# Patient Record
Sex: MALE | Race: WHITE | Employment: STUDENT | URBAN - METROPOLITAN AREA
[De-identification: names, ages, dates, MRNs, and addresses within clinical notes are randomized per-mention and may not be internally consistent; named-entity substitution may affect disease eponyms.]

---
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
## 2022-01-17 ENCOUNTER — OFFICE VISIT (OUTPATIENT)
Dept: FAMILY MEDICINE CLINIC | Age: 22
End: 2022-01-17
Payer: COMMERCIAL

## 2022-01-17 VITALS
DIASTOLIC BLOOD PRESSURE: 70 MMHG | OXYGEN SATURATION: 95 % | HEART RATE: 78 BPM | SYSTOLIC BLOOD PRESSURE: 120 MMHG | WEIGHT: 154 LBS | BODY MASS INDEX: 19.64 KG/M2

## 2022-01-17 DIAGNOSIS — L72.9 CYST OF SKIN: Primary | ICD-10-CM

## 2022-01-17 PROCEDURE — 99213 OFFICE O/P EST LOW 20 MIN: CPT | Performed by: NURSE PRACTITIONER

## 2022-01-17 RX ORDER — SULFAMETHOXAZOLE AND TRIMETHOPRIM 800; 160 MG/1; MG/1
1 TABLET ORAL 2 TIMES DAILY
Qty: 14 TABLET | Refills: 0 | Status: SHIPPED | OUTPATIENT
Start: 2022-01-17 | End: 2022-01-24

## 2022-01-17 ASSESSMENT — ENCOUNTER SYMPTOMS
SHORTNESS OF BREATH: 0
COUGH: 0

## 2022-01-17 NOTE — PROGRESS NOTES
[address] [place] [address], [address] [place]  [phone]    Maria Eugenia Green is a 25 y.o. male who presents today for his  medical conditions/complaints as noted below. Maria Eugenia Green is c/o of Mass (back of neck)  . HPI:     HPI   Cyst has been there a year, it has gotten bigger. He has been seen by derm for recurrent cysts in the past, they didn't really have anything to add. The cyst on the back of his neck is painful. It is just off of midline to the left. Nursing note reviewed and discussed with patient. Patient's medications, allergies, past medical, surgical, social and family histories were reviewed and updated asappropriate. Current Outpatient Medications   Medication Sig Dispense Refill    sulfamethoxazole-trimethoprim (BACTRIM DS) 800-160 MG per tablet Take 1 tablet by mouth 2 times daily for 7 days 14 tablet 0     No current facility-administered medications for this visit. No past medical history on file. No past surgical history on file. No family history on file. Social History     Tobacco Use    Smoking status: Never Smoker    Smokeless tobacco: Never Used   Substance Use Topics    Alcohol use: Never      No Known Allergies    Subjective:      Review of Systems   Constitutional: Negative for chills and fever. Respiratory: Negative for cough and shortness of breath. Cardiovascular: Negative for chest pain and leg swelling. Other pertinent ROS in HPI  Objective:     /70 (Site: Left Upper Arm, Position: Sitting, Cuff Size: Medium Adult)   Pulse 78   Wt 154 lb (69.9 kg)   SpO2 95%   BMI 19.64 kg/m²    Physical Exam  Constitutional:       General: He is not in acute distress. Appearance: He is well-developed. HENT:      Head: Normocephalic. Right Ear: External ear normal.      Left Ear: External ear normal.   Cardiovascular:      Rate and Rhythm: Normal rate and regular rhythm.       Heart sounds: Normal heart sounds. Pulmonary:      Effort: Pulmonary effort is normal.      Breath sounds: Normal breath sounds. Musculoskeletal:         General: Normal range of motion. Skin:     General: Skin is warm and dry. Comments: 1 1/2 inch round inflamed cyst, red, swollen, no surrounding erythema, no drainage. Neurological:      Mental Status: He is alert and oriented to person, place, and time. Assessment/PLAN   1. Cyst of skin  Warm compress to area multiple times a day  Call soon for derm apt  Do not feel comfortable lancing today d/t location  - sulfamethoxazole-trimethoprim (BACTRIM DS) 800-160 MG per tablet; Take 1 tablet by mouth 2 times daily for 7 days  Dispense: 14 tablet; Refill: 0  - AFL - Francisco J Teixeira MD, Dermatology, [facility] if symptoms worsen or fail to improve  Pt agreeable with plan      Patient given educational materials - see patientinstructions. Discussed use, benefit, and side effects of prescribed medications. All patient questions answered. Pt voiced understanding. Reviewed health maintenance. Instructed to continue current medications, diet andexercise. 1.  Mike received counseling on the following healthy behaviors: nutrition, exercise and medication adherence  2. Patient given educationalmaterials when available - see patient instructions when applicable  3. Discussed use, benefit, and side effects of prescribed medications. Barriersto medication compliance addressed. All patient questions answered. Pt voiced understanding. 4.  Reviewed prior labs and health maintenance when applicable. 5.  Continue current medications, diet and exercise. CompletedRefills   Requested Prescriptions     Signed Prescriptions Disp Refills    sulfamethoxazole-trimethoprim (BACTRIM DS) 800-160 MG per tablet 14 tablet 0     Sig: Take 1 tablet by mouth 2 times daily for 7 days       This note was transcribed using dictation with Dragon services.  Efforts were made to correct any errors but some words may be misinterpreted.     Electronically signed by MARCIE Mcwilliams CNP, CNP on 1/17/2022 at 3:27 PM

## 2022-01-17 NOTE — PROGRESS NOTES
Patient is present complaining of bump on back of neck x2 weeks  Patient states it has been getting bigger and is painful

## 2023-08-10 ENCOUNTER — HOSPITAL ENCOUNTER (OUTPATIENT)
Age: 23
Setting detail: SPECIMEN
Discharge: HOME | End: 2023-08-10

## 2023-08-10 ENCOUNTER — OFFICE VISIT (OUTPATIENT)
Dept: FAMILY MEDICINE CLINIC | Age: 23
End: 2023-08-10
Payer: COMMERCIAL

## 2023-08-10 VITALS
DIASTOLIC BLOOD PRESSURE: 70 MMHG | BODY MASS INDEX: 21.94 KG/M2 | WEIGHT: 171 LBS | OXYGEN SATURATION: 96 % | RESPIRATION RATE: 20 BRPM | HEART RATE: 73 BPM | SYSTOLIC BLOOD PRESSURE: 104 MMHG | HEIGHT: 74 IN

## 2023-08-10 DIAGNOSIS — F90.0 ATTENTION DEFICIT HYPERACTIVITY DISORDER (ADHD), PREDOMINANTLY INATTENTIVE TYPE: ICD-10-CM

## 2023-08-10 DIAGNOSIS — Z02.89 MEDICATION MANAGEMENT CONTRACT AGREEMENT: ICD-10-CM

## 2023-08-10 DIAGNOSIS — Z00.00 ANNUAL VISIT FOR GENERAL ADULT MEDICAL EXAMINATION WITHOUT ABNORMAL FINDINGS: Primary | ICD-10-CM

## 2023-08-10 PROCEDURE — 99395 PREV VISIT EST AGE 18-39: CPT | Performed by: NURSE PRACTITIONER

## 2023-08-10 PROCEDURE — 99214 OFFICE O/P EST MOD 30 MIN: CPT | Performed by: NURSE PRACTITIONER

## 2023-08-10 SDOH — ECONOMIC STABILITY: FOOD INSECURITY: WITHIN THE PAST 12 MONTHS, YOU WORRIED THAT YOUR FOOD WOULD RUN OUT BEFORE YOU GOT MONEY TO BUY MORE.: NEVER TRUE

## 2023-08-10 SDOH — ECONOMIC STABILITY: HOUSING INSECURITY
IN THE LAST 12 MONTHS, WAS THERE A TIME WHEN YOU DID NOT HAVE A STEADY PLACE TO SLEEP OR SLEPT IN A SHELTER (INCLUDING NOW)?: NO

## 2023-08-10 SDOH — ECONOMIC STABILITY: INCOME INSECURITY: HOW HARD IS IT FOR YOU TO PAY FOR THE VERY BASICS LIKE FOOD, HOUSING, MEDICAL CARE, AND HEATING?: NOT HARD AT ALL

## 2023-08-10 SDOH — ECONOMIC STABILITY: FOOD INSECURITY: WITHIN THE PAST 12 MONTHS, THE FOOD YOU BOUGHT JUST DIDN'T LAST AND YOU DIDN'T HAVE MONEY TO GET MORE.: NEVER TRUE

## 2023-08-10 ASSESSMENT — PATIENT HEALTH QUESTIONNAIRE - PHQ9
SUM OF ALL RESPONSES TO PHQ QUESTIONS 1-9: 0
2. FEELING DOWN, DEPRESSED OR HOPELESS: 0
SUM OF ALL RESPONSES TO PHQ9 QUESTIONS 1 & 2: 0
SUM OF ALL RESPONSES TO PHQ QUESTIONS 1-9: 0
SUM OF ALL RESPONSES TO PHQ QUESTIONS 1-9: 0
1. LITTLE INTEREST OR PLEASURE IN DOING THINGS: 0
SUM OF ALL RESPONSES TO PHQ QUESTIONS 1-9: 0

## 2023-08-10 ASSESSMENT — ENCOUNTER SYMPTOMS
CONSTIPATION: 0
SINUS PRESSURE: 0
ABDOMINAL PAIN: 0
CHEST TIGHTNESS: 0
BACK PAIN: 0
SINUS PAIN: 0
COLOR CHANGE: 0
DIARRHEA: 0
SHORTNESS OF BREATH: 0
EYE PAIN: 0
NAUSEA: 0
VOMITING: 0

## 2023-08-10 NOTE — PROGRESS NOTES
Micky Ayers (:  2000) is a 23 y.o. male,Established patient, here for evaluation of the following chief complaint(s): Annual Exam (States he was tested for ADHD-[facility] in [place]. Paperwork was provided. ) and Health Maintenance (Depression screen completed. /SDOH completed. /)         ASSESSMENT/PLAN:  1. Annual visit for general adult medical examination without abnormal findings  Comments:  School physical form signed and scanned into media. 2. Medication management contract agreement  -     Drug Screen, Pain; Future  3. Attention deficit hyperactivity disorder (ADHD), predominantly inattentive type  -     lisdexamfetamine (VYVANSE) 30 MG capsule; Take 1 capsule by mouth every morning for 30 days. Max Daily Amount: 30 mg, Disp-30 capsule, R-0Normal  ADHD evaluation form with diagnosis scanned into Media  Medication contract signed today  IVETT obtained today  Discussed we will start Vyvanse Rx today, if dose has to be titrated more than once or if medication does not seem to work for him he will need to establish with psychiatrist for management of this condition. Verbalized understanding. Return in about 3 months (around 11/10/2023) for 3 month follow up - ADHD . Subjective   SUBJECTIVE/OBJECTIVE:  Presents for annual physical and to discuss ADHD   Going to nursing school at [employer]  Has 2 more semesters of school left   Has physical form he needs completed     Had exam completed through [facility], diagnosed with ADHD. Referred back to PCP for further management.    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

## 2023-08-15 LAB
6-ACETYLMORPHINE, UR: NOT DETECTED
7-AMINOCLONAZEPAM, URINE: NOT DETECTED
ALPHA-OH-ALPRAZ, URINE: NOT DETECTED
ALPHA-OH-MIDAZOLAM, URINE: NOT DETECTED
ALPRAZOLAM, URINE: NOT DETECTED
AMPHETAMINES, URINE: NOT DETECTED
BARBITURATES, URINE: NOT DETECTED
BENZOYLECGONINE, UR: NORMAL
BUPRENORPHINE URINE: NOT DETECTED
CARISOPRODOL, UR: NOT DETECTED
CLONAZEPAM, URINE: NOT DETECTED
CODEINE, URINE: NOT DETECTED
CREATININE URINE: 188 MG/DL (ref 20–400)
DIAZEPAM, URINE: NOT DETECTED
DRUGS EXPECTED, UR: NORMAL
EER HI RES INTERP UR: NORMAL
ETHYL GLUCURONIDE UR: NORMAL
FENTANYL URINE: NOT DETECTED
GABAPENTIN: PRESENT
HYDROCODONE, OPI6M: PRESENT
HYDROMORPHONE, OPI3M: PRESENT
LORAZEPAM, URINE: NOT DETECTED
MARIJUANA METAB, UR: NORMAL
MDA, UR: NOT DETECTED
MDEA, EVE, UR: NOT DETECTED
MDMA URINE: NOT DETECTED
MEPERIDINE METAB, UR: NOT DETECTED
METHADONE, URINE: NOT DETECTED
METHAMPHETAMINE, URINE: NOT DETECTED
METHYLPHENIDATE: NOT DETECTED
MIDAZOLAM, URINE: NOT DETECTED
MORPHINE, OPI1M: NOT DETECTED
NALOXONE URINE: NOT DETECTED
NORBUPRENORPHINE, URINE: NOT DETECTED
NORDIAZEPAM, URINE: NOT DETECTED
NORFENTANYL, URINE: NOT DETECTED
NORHYDROCODONE, URINE: PRESENT
NOROXYCODONE, URINE: NOT DETECTED
NOROXYMORPHONE, URINE: NOT DETECTED
OXAZEPAM, URINE: NOT DETECTED
OXYCODONE URINE: NOT DETECTED
OXYMORPHONE, URINE: NOT DETECTED
PAIN MANAGEMENT DRUG PANEL INTERP, URINE: NORMAL
PAIN MGT DRUG PANEL, HI RES, UR: NORMAL
PCP,URINE: NORMAL
PHENTERMINE, UR: NOT DETECTED
PREGABALIN: NOT DETECTED
TAPENTADOL, URINE: NOT DETECTED
TAPENTADOL-O-SULFATE, URINE: NOT DETECTED
TEMAZEPAM, URINE: NOT DETECTED
TRAMADOL, URINE: NORMAL
ZOLPIDEM METABOLITE (ZCA), URINE: NOT DETECTED
ZOLPIDEM, URINE: NOT DETECTED

## 2023-09-14 ENCOUNTER — OFFICE VISIT (OUTPATIENT)
Dept: FAMILY MEDICINE CLINIC | Age: 23
End: 2023-09-14
Payer: COMMERCIAL

## 2023-09-14 VITALS
OXYGEN SATURATION: 98 % | TEMPERATURE: 98.5 F | HEIGHT: 74 IN | RESPIRATION RATE: 20 BRPM | HEART RATE: 69 BPM | WEIGHT: 169.4 LBS | SYSTOLIC BLOOD PRESSURE: 110 MMHG | BODY MASS INDEX: 21.74 KG/M2 | DIASTOLIC BLOOD PRESSURE: 70 MMHG

## 2023-09-14 DIAGNOSIS — R06.02 SHORTNESS OF BREATH: ICD-10-CM

## 2023-09-14 DIAGNOSIS — R82.5 POSITIVE URINE DRUG SCREEN: ICD-10-CM

## 2023-09-14 DIAGNOSIS — F90.0 ATTENTION DEFICIT HYPERACTIVITY DISORDER (ADHD), PREDOMINANTLY INATTENTIVE TYPE: ICD-10-CM

## 2023-09-14 DIAGNOSIS — J06.9 URI WITH COUGH AND CONGESTION: Primary | ICD-10-CM

## 2023-09-14 PROCEDURE — 99214 OFFICE O/P EST MOD 30 MIN: CPT | Performed by: NURSE PRACTITIONER

## 2023-09-14 RX ORDER — METHYLPREDNISOLONE 4 MG/1
TABLET ORAL
Qty: 1 KIT | Refills: 0 | Status: SHIPPED | OUTPATIENT
Start: 2023-09-14 | End: 2023-09-20

## 2023-09-14 RX ORDER — BENZONATATE 100 MG/1
100-200 CAPSULE ORAL 3 TIMES DAILY PRN
Qty: 60 CAPSULE | Refills: 0 | Status: SHIPPED | OUTPATIENT
Start: 2023-09-14 | End: 2023-09-21

## 2023-09-14 RX ORDER — ALBUTEROL SULFATE 90 UG/1
2 AEROSOL, METERED RESPIRATORY (INHALATION) EVERY 6 HOURS PRN
Qty: 18 G | Refills: 3 | Status: SHIPPED | OUTPATIENT
Start: 2023-09-14

## 2023-09-14 RX ORDER — AZITHROMYCIN 250 MG/1
250 TABLET, FILM COATED ORAL SEE ADMIN INSTRUCTIONS
Qty: 6 TABLET | Refills: 0 | Status: SHIPPED | OUTPATIENT
Start: 2023-09-14 | End: 2023-09-19

## 2023-09-14 ASSESSMENT — ENCOUNTER SYMPTOMS
SHORTNESS OF BREATH: 1
SINUS PRESSURE: 0
SINUS PAIN: 0
COUGH: 1
DIARRHEA: 0
CHEST TIGHTNESS: 0
NAUSEA: 0
WHEEZING: 0

## 2023-09-14 NOTE — PROGRESS NOTES
SUBJECTIVE/OBJECTIVE:  Presents for acute visit    Has had cough, congestion, runny nose for the last 2.5 weeks   Currently has dry intermittent cough   No sinus pain/pressure, fever, body aches/chills, n/v/d or headache   Has treated symptoms with OTC nyquil, antihistamines - did not help much  Did not get swabbed for covid-19    Would like to complete another IVETT   Feels previous results were inaccurate because he does not take anything not prescribed to him  Only drinks pre work out supplement     Denies any other problems/concerns at this time         Review of Systems   Constitutional:  Negative for fatigue and fever. HENT:  Positive for congestion. Negative for ear discharge, ear pain, postnasal drip, sinus pressure and sinus pain. Respiratory:  Positive for cough and shortness of breath. Negative for chest tightness and wheezing. Gastrointestinal:  Negative for diarrhea and nausea. Psychiatric/Behavioral:  Positive for decreased concentration. The patient is not nervous/anxious. Objective   Physical Exam  Vitals and nursing note reviewed. Constitutional:       Appearance: Normal appearance. He is not ill-appearing. HENT:      Head: Normocephalic. Right Ear: Hearing, tympanic membrane, ear canal and external ear normal.      Left Ear: Hearing, tympanic membrane, ear canal and external ear normal.      Nose: Congestion present. No rhinorrhea. Right Turbinates: Swollen. Left Turbinates: Swollen. Right Sinus: No maxillary sinus tenderness or frontal sinus tenderness. Left Sinus: No maxillary sinus tenderness or frontal sinus tenderness. Mouth/Throat:      Mouth: Mucous membranes are moist.      Pharynx: Oropharynx is clear. Uvula midline. Posterior oropharyngeal erythema present. No pharyngeal swelling or uvula swelling. Tonsils: No tonsillar exudate. Eyes:      Extraocular Movements: Extraocular movements intact.       Conjunctiva/sclera: Conjunctivae

## 2025-05-29 ASSESSMENT — PATIENT HEALTH QUESTIONNAIRE - PHQ9
SUM OF ALL RESPONSES TO PHQ QUESTIONS 1-9: 0
2. FEELING DOWN, DEPRESSED OR HOPELESS: NOT AT ALL
SUM OF ALL RESPONSES TO PHQ QUESTIONS 1-9: 0
SUM OF ALL RESPONSES TO PHQ QUESTIONS 1-9: 0
1. LITTLE INTEREST OR PLEASURE IN DOING THINGS: NOT AT ALL
SUM OF ALL RESPONSES TO PHQ QUESTIONS 1-9: 0
1. LITTLE INTEREST OR PLEASURE IN DOING THINGS: NOT AT ALL
SUM OF ALL RESPONSES TO PHQ9 QUESTIONS 1 & 2: 0
2. FEELING DOWN, DEPRESSED OR HOPELESS: NOT AT ALL

## 2025-05-30 ENCOUNTER — OFFICE VISIT (OUTPATIENT)
Dept: FAMILY MEDICINE CLINIC | Age: 25
End: 2025-05-30
Payer: COMMERCIAL

## 2025-05-30 VITALS
OXYGEN SATURATION: 98 % | BODY MASS INDEX: 22.84 KG/M2 | HEIGHT: 74 IN | HEART RATE: 71 BPM | WEIGHT: 178 LBS | DIASTOLIC BLOOD PRESSURE: 80 MMHG | SYSTOLIC BLOOD PRESSURE: 130 MMHG

## 2025-05-30 DIAGNOSIS — Z13.220 SCREENING, LIPID: ICD-10-CM

## 2025-05-30 DIAGNOSIS — K31.9 STOMACH DISORDER: ICD-10-CM

## 2025-05-30 DIAGNOSIS — Z79.899 MEDICATION MANAGEMENT: ICD-10-CM

## 2025-05-30 DIAGNOSIS — Z00.00 ENCOUNTER FOR WELL ADULT EXAM WITHOUT ABNORMAL FINDINGS: ICD-10-CM

## 2025-05-30 DIAGNOSIS — Z13.29 SCREENING FOR THYROID DISORDER: Primary | ICD-10-CM

## 2025-05-30 PROCEDURE — 99214 OFFICE O/P EST MOD 30 MIN: CPT | Performed by: NURSE PRACTITIONER

## 2025-05-30 RX ORDER — LISDEXAMFETAMINE DIMESYLATE 30 MG/1
30 CAPSULE ORAL EVERY MORNING
COMMUNITY

## 2025-05-30 SDOH — ECONOMIC STABILITY: FOOD INSECURITY: WITHIN THE PAST 12 MONTHS, THE FOOD YOU BOUGHT JUST DIDN'T LAST AND YOU DIDN'T HAVE MONEY TO GET MORE.: NEVER TRUE

## 2025-05-30 SDOH — ECONOMIC STABILITY: FOOD INSECURITY: WITHIN THE PAST 12 MONTHS, YOU WORRIED THAT YOUR FOOD WOULD RUN OUT BEFORE YOU GOT MONEY TO BUY MORE.: NEVER TRUE

## 2025-05-30 ASSESSMENT — ENCOUNTER SYMPTOMS
COUGH: 0
SHORTNESS OF BREATH: 0

## 2025-05-30 NOTE — PROGRESS NOTES
Mike Prasad (:  2000) is a 24 y.o. male,Established patient, here for evaluation of the following chief complaint(s):  Annual Exam    Assessment & Plan  Screening for thyroid disorder       Orders:    TSH; Future    Screening, lipid       Orders:    Lipid, Fasting; Future    Medication management       Orders:    CBC; Future    Comprehensive Metabolic Panel; Future    Stomach disorder   New, uncertain prognosis, continue current plan pending work up below    Orders:    Julio César Galdamez MD, Gastroenterology, [facility]    Encounter for well adult exam without abnormal findings              No follow-ups on file.       Subjective   HPI  Some gi concerns. Has some nausea and vomiting if he doesn't eat regularly.  Heart burn has been a common issue for him.  He feels this got worse four months ago. Has tried some otc stomach relief meds.   Drinking enough water.     Review of Systems   Constitutional:  Negative for chills and fever.   Respiratory:  Negative for cough and shortness of breath.    Cardiovascular:  Negative for chest pain and leg swelling.     Objective   Vitals:    25 0816   BP: 130/80   Pulse: 71   SpO2: 98%     Wt Readings from Last 3 Encounters:   25 80.7 kg (178 lb)   23 76.8 kg (169 lb 6.4 oz)   08/10/23 77.6 kg (171 lb)       Physical Exam  Constitutional:       General: He is not in acute distress.     Appearance: He is well-developed.   HENT:      Head: Normocephalic.      Right Ear: External ear normal.      Left Ear: External ear normal.   Cardiovascular:      Rate and Rhythm: Normal rate and regular rhythm.      Heart sounds: Normal heart sounds.   Pulmonary:      Effort: Pulmonary effort is normal.      Breath sounds: Normal breath sounds.   Musculoskeletal:         General: Normal range of motion.   Skin:     General: Skin is warm and dry.   Neurological:      Mental Status: He is alert and oriented to person, place, and time.           An electronic

## 2025-06-02 ENCOUNTER — TELEPHONE (OUTPATIENT)
Dept: GASTROENTEROLOGY | Age: 25
End: 2025-06-02

## 2025-06-02 NOTE — TELEPHONE ENCOUNTER
attempt 1 lm with mom for pt to call to schedule np appt-paula  attempt letter sent    Referred for stomach disorder

## 2025-07-02 NOTE — PROGRESS NOTES
fever, no chills, no lethargy, no weakness.  HEENT:  No headache, otalgia, itchy eyes, nasal discharge or sore throat.  Cardiac:  No chest pain, dyspnea, orthopnea or PND.  Chest:   No cough, phlegm or wheezing.  Abdomen:      Detailed by MA and HPI above  Neuro:  No focal weakness, abnormal movements or seizure like activity.  Skin:   No rashes, no itching.  :   No hematuria, no pyuria, no dysuria, no flank pain.  Extremities:  No swelling or joint pains.  ROS was otherwise negative    PHYSICAL EXAMINATION:   /82   Pulse 72   Ht 1.88 m (6' 2\")   Wt 76.9 kg (169 lb 9.6 oz)   SpO2 98%   BMI 21.78 kg/m²     Constitutional: Patient is oriented to person, place, and time. Patient appears well-developed and well-nourished.   HEENT: Normocephalic and atraumatic. EOM are normal. Normal range of motion. Neck supple, moist mucous membranes, no jaundice   Cardiovascular: Normal rate  Pulmonary/Chest: Effort normal and no respiratory distress. Equal bilateral chest rise.  Abdominal: Soft. Exhibits no distension and no mass. There is no tenderness. There is no rebound and no guarding.  Musculoskeletal: Normal range of motion.   Neurological: Patient is alert and oriented to person, place, and time. Motor grossly normal.   Psychiatric: Patient has a normal mood and affect. Patient behavior is normal.     LABORATORY DATA: Reviewed  No results found for: \"WBC\", \"HGB\", \"HCT\", \"MCV\", \"PLT\", \"NA\", \"K\", \"CL\", \"CO2\", \"BUN\", \"CREATININE\", \"LABPROT\", \"LABALBU\", \"GGT\", \"BILITOT\", \"ALKPHOS\", \"AST\", \"ALT\", \"INR\"    No results found for: \"RBC\", \"HGB\", \"MCV\", \"MCH\", \"MCHC\", \"RDW\", \"MPV\", \"BASOPCT\", \"LYMPHSABS\", \"MONOSABS\", \"NEUTROABS\", \"EOSABS\", \"BASOSABS\"    IMPRESSION: Mr.Austin Prasad is a very pleasant 24 y.o. male referred as new consultation for evaluation of weight loss, loss of appetite, epigastric pain    ASSESSMENT AND PLAN  1. Weight loss    2. Epigastric pain    3. Nausea and vomiting, unspecified vomiting type    4.

## 2025-07-03 ENCOUNTER — OFFICE VISIT (OUTPATIENT)
Dept: GASTROENTEROLOGY | Age: 25
End: 2025-07-03
Payer: COMMERCIAL

## 2025-07-03 ENCOUNTER — HOSPITAL ENCOUNTER (OUTPATIENT)
Age: 25
Setting detail: SPECIMEN
Discharge: HOME | End: 2025-07-03

## 2025-07-03 ENCOUNTER — PREP FOR PROCEDURE (OUTPATIENT)
Dept: GASTROENTEROLOGY | Age: 25
End: 2025-07-03

## 2025-07-03 VITALS
OXYGEN SATURATION: 98 % | BODY MASS INDEX: 21.77 KG/M2 | HEIGHT: 74 IN | HEART RATE: 72 BPM | SYSTOLIC BLOOD PRESSURE: 124 MMHG | DIASTOLIC BLOOD PRESSURE: 82 MMHG | WEIGHT: 169.6 LBS

## 2025-07-03 DIAGNOSIS — R10.13 EPIGASTRIC PAIN: ICD-10-CM

## 2025-07-03 DIAGNOSIS — R63.4 WEIGHT LOSS: Primary | ICD-10-CM

## 2025-07-03 DIAGNOSIS — R63.4 WEIGHT LOSS: ICD-10-CM

## 2025-07-03 DIAGNOSIS — Z79.899 MEDICATION MANAGEMENT: ICD-10-CM

## 2025-07-03 DIAGNOSIS — R11.2 NAUSEA AND VOMITING, UNSPECIFIED VOMITING TYPE: ICD-10-CM

## 2025-07-03 DIAGNOSIS — Z13.220 SCREENING, LIPID: ICD-10-CM

## 2025-07-03 DIAGNOSIS — K21.9 GASTROESOPHAGEAL REFLUX DISEASE, UNSPECIFIED WHETHER ESOPHAGITIS PRESENT: ICD-10-CM

## 2025-07-03 DIAGNOSIS — Z13.29 SCREENING FOR THYROID DISORDER: ICD-10-CM

## 2025-07-03 PROBLEM — R76.11 POSITIVE SKIN TEST FOR TUBERCULOSIS: Status: ACTIVE | Noted: 2023-11-03

## 2025-07-03 LAB
ALBUMIN SERPL-MCNC: 4.7 G/DL (ref 3.5–5.2)
ALBUMIN SERPL-MCNC: 4.8 G/DL (ref 3.5–5.2)
ALBUMIN/GLOB SERPL: 1.4 {RATIO} (ref 1–2.5)
ALBUMIN/GLOB SERPL: 1.5 {RATIO} (ref 1–2.5)
ALP SERPL-CCNC: 61 U/L (ref 40–129)
ALP SERPL-CCNC: 64 U/L (ref 40–129)
ALT SERPL-CCNC: 19 U/L (ref 10–50)
ALT SERPL-CCNC: 20 U/L (ref 10–50)
ANION GAP SERPL CALCULATED.3IONS-SCNC: 10 MMOL/L (ref 9–16)
ANION GAP SERPL CALCULATED.3IONS-SCNC: 11 MMOL/L (ref 9–16)
AST SERPL-CCNC: 25 U/L (ref 10–50)
AST SERPL-CCNC: 25 U/L (ref 10–50)
BASOPHILS # BLD: 0.04 K/UL (ref 0–0.2)
BASOPHILS NFR BLD: 1 % (ref 0–2)
BILIRUB DIRECT SERPL-MCNC: 0.3 MG/DL (ref 0–0.2)
BILIRUB INDIRECT SERPL-MCNC: 0.6 MG/DL (ref 0–1)
BILIRUB SERPL-MCNC: 0.9 MG/DL (ref 0–1.2)
BILIRUB SERPL-MCNC: 0.9 MG/DL (ref 0–1.2)
BUN SERPL-MCNC: 15 MG/DL (ref 6–20)
BUN SERPL-MCNC: 16 MG/DL (ref 6–20)
CALCIUM SERPL-MCNC: 9.6 MG/DL (ref 8.6–10.4)
CALCIUM SERPL-MCNC: 9.7 MG/DL (ref 8.6–10.4)
CHLORIDE SERPL-SCNC: 104 MMOL/L (ref 98–107)
CHLORIDE SERPL-SCNC: 105 MMOL/L (ref 98–107)
CHOLEST SERPL-MCNC: 154 MG/DL (ref 0–199)
CHOLESTEROL/HDL RATIO: 3.6
CO2 SERPL-SCNC: 25 MMOL/L (ref 20–31)
CO2 SERPL-SCNC: 25 MMOL/L (ref 20–31)
CREAT SERPL-MCNC: 1.1 MG/DL (ref 0.7–1.2)
CREAT SERPL-MCNC: 1.2 MG/DL (ref 0.7–1.2)
EOSINOPHIL # BLD: 0.15 K/UL (ref 0–0.44)
EOSINOPHILS RELATIVE PERCENT: 2 % (ref 1–4)
ERYTHROCYTE [DISTWIDTH] IN BLOOD BY AUTOMATED COUNT: 12.4 % (ref 11.8–14.4)
ERYTHROCYTE [DISTWIDTH] IN BLOOD BY AUTOMATED COUNT: 12.5 % (ref 11.8–14.4)
GFR, ESTIMATED: 88 ML/MIN/1.73M2
GFR, ESTIMATED: >90 ML/MIN/1.73M2
GLUCOSE SERPL-MCNC: 91 MG/DL (ref 74–99)
GLUCOSE SERPL-MCNC: 94 MG/DL (ref 74–99)
HCT VFR BLD AUTO: 48.8 % (ref 40.7–50.3)
HCT VFR BLD AUTO: 49.1 % (ref 40.7–50.3)
HDLC SERPL-MCNC: 43 MG/DL
HGB BLD-MCNC: 16.9 G/DL (ref 13–17)
HGB BLD-MCNC: 17 G/DL (ref 13–17)
IMM GRANULOCYTES # BLD AUTO: <0.03 K/UL (ref 0–0.3)
IMM GRANULOCYTES NFR BLD: 0 %
LDLC SERPL CALC-MCNC: 102 MG/DL (ref 0–100)
LIPASE SERPL-CCNC: 37 U/L (ref 13–60)
LYMPHOCYTES NFR BLD: 2.8 K/UL (ref 1.1–3.7)
LYMPHOCYTES RELATIVE PERCENT: 42 % (ref 24–43)
MCH RBC QN AUTO: 30.5 PG (ref 25.2–33.5)
MCH RBC QN AUTO: 30.6 PG (ref 25.2–33.5)
MCHC RBC AUTO-ENTMCNC: 34.6 G/DL (ref 28.4–34.8)
MCHC RBC AUTO-ENTMCNC: 34.6 G/DL (ref 28.4–34.8)
MCV RBC AUTO: 88.1 FL (ref 82.6–102.9)
MCV RBC AUTO: 88.5 FL (ref 82.6–102.9)
MONOCYTES NFR BLD: 0.53 K/UL (ref 0.1–1.2)
MONOCYTES NFR BLD: 8 % (ref 3–12)
NEUTROPHILS NFR BLD: 47 % (ref 36–65)
NEUTS SEG NFR BLD: 3.08 K/UL (ref 1.5–8.1)
NRBC BLD-RTO: 0 PER 100 WBC
NRBC BLD-RTO: 0 PER 100 WBC
PLATELET # BLD AUTO: 259 K/UL (ref 138–453)
PLATELET # BLD AUTO: 262 K/UL (ref 138–453)
PMV BLD AUTO: 9.4 FL (ref 8.1–13.5)
PMV BLD AUTO: 9.5 FL (ref 8.1–13.5)
POTASSIUM SERPL-SCNC: 4.7 MMOL/L (ref 3.7–5.3)
POTASSIUM SERPL-SCNC: 4.8 MMOL/L (ref 3.7–5.3)
PROT SERPL-MCNC: 7.9 G/DL (ref 6.6–8.7)
PROT SERPL-MCNC: 8 G/DL (ref 6.6–8.7)
RBC # BLD AUTO: 5.54 M/UL (ref 4.21–5.77)
RBC # BLD AUTO: 5.55 M/UL (ref 4.21–5.77)
SODIUM SERPL-SCNC: 140 MMOL/L (ref 136–145)
SODIUM SERPL-SCNC: 140 MMOL/L (ref 136–145)
TRIGL SERPL-MCNC: 47 MG/DL (ref 0–149)
TSH SERPL DL<=0.05 MIU/L-ACNC: 1.64 UIU/ML (ref 0.27–4.2)
VLDLC SERPL CALC-MCNC: 9 MG/DL (ref 1–30)
WBC OTHER # BLD: 6.6 K/UL (ref 3.5–11.3)
WBC OTHER # BLD: 6.6 K/UL (ref 3.5–11.3)

## 2025-07-03 PROCEDURE — 99204 OFFICE O/P NEW MOD 45 MIN: CPT | Performed by: STUDENT IN AN ORGANIZED HEALTH CARE EDUCATION/TRAINING PROGRAM

## 2025-07-07 ENCOUNTER — RESULTS FOLLOW-UP (OUTPATIENT)
Dept: FAMILY MEDICINE CLINIC | Age: 25
End: 2025-07-07

## 2025-07-07 ENCOUNTER — TELEPHONE (OUTPATIENT)
Dept: GASTROENTEROLOGY | Age: 25
End: 2025-07-07

## 2025-07-07 NOTE — TELEPHONE ENCOUNTER
Procedure scheduled/Dr Pemberton  Procedure: egd  Dx: gerd  Date: 7/10/25   Time: 12pm arrival 10am   Hospital: [facility] phone call: tbd   Bowel Prep instructions given: n/a   In office/via phone: office  Clearance needed: none  GLP - 1: none

## 2025-07-08 VITALS — BODY MASS INDEX: 21.69 KG/M2 | HEIGHT: 74 IN | WEIGHT: 169 LBS

## 2025-07-08 NOTE — PRE-PROCEDURE INSTRUCTIONS
No answer, left message ? Yes, voicemail                            Unable to leave message ?    When were you told to arrive at hospital ? 1000     Do you have a  ?    Are you on any blood thinners ?  no                   If yes when did you stop taking ?    Do you have your prep Rx filled and instruction ?  N/a    Nothing to eat the day before , only clear liquids.n/a    Are you experiencing any covid symptoms ?     Do you have any infections or rash we should be aware of ?      Do you have the Hibiclens soap to use the night before and the morning of surgery ?n/a    Nothing to eat or drink after midnight, only a sip of water to take any medication instructed to take the night before.  Wear comfortable clothing, leave any valuables at home, remove any jewelry and body piercing .

## 2025-07-08 NOTE — PROGRESS NOTES
Pre-op Instructions For Out-Patient Endoscopy Surgery    Medication Instructions:  Please stop herbs and any supplements now (includes vitamins and minerals).    For these medications:  Dulaglutide (Trulicity), Exenatide (Byetta and Bydureon, Liraglutide (Victoza), Lixisenatide (Adlyxin), Semaglutide (Ozempic and Rybelsus), Tirzepatide (Mounjaro, Zepbound,Wegovy)- Stop 1 week prior if taking weekly or 1 day prior if taking every 12 hours or daily.     Please contact your surgeon and prescribing physician for pre-op instructions for any blood thinners. Stop taking as directed    If you have inhalers/aerosol treatments at home, please use them the morning of your surgery and bring the inhalers with you to the hospital.    Please take the following medications the morning of your surgery with a sip of water:    Inhaler    Surgery Instructions:  After midnight before surgery:  Do not eat or drink anything, including water, mints, gum, and hard candy.  You may brush your teeth without swallowing.  No smoking, chewing tobacco, or street drugs.       ** Please Follow Bowel Prep instructions if given by surgeon's office**    Please shower or bathe before surgery.       Please do not wear any cologne, lotion, powder, jewelry, piercings, perfume, makeup, nail polish, hair accessories, or hair spray on the day of surgery.  Wear loose comfortable clothing.    Leave your valuables at home but bring a payment source for any after-surgery prescriptions you plan to fill at [facility].  Bring a storage case for any glasses/contacts.    An adult who is responsible for you MUST remain in the hospital and drive you home and should be with you for the first 24 hours after surgery.     The Day of Surgery:  Arrive at [facility] Surgery Entrance at the time directed by your surgeon and check in at the desk.     If you have a living will or healthcare power of , please bring a copy.    You will be taken to  the pre-op holding area where you will be prepared for surgery.  A physical assessment will be performed by a nurse practitioner or house officer.  Your IV will be started and you will meet your anesthesiologist.    When you go to surgery, your family will be directed to the surgical waiting room, where the doctor should speak with them after your surgery.    After surgery, you will be taken to the recovery area.  When you are alert and stable, you will receive instructions and be prepared for discharge.   Instructions reviewed, patient verbalizes understanding  EGD 7/10/25

## 2025-07-09 ENCOUNTER — HOSPITAL ENCOUNTER (OUTPATIENT)
Dept: PREADMISSION TESTING | Age: 25
Setting detail: OUTPATIENT SURGERY
Discharge: HOME | End: 2025-07-13
Payer: COMMERCIAL

## 2025-07-09 ENCOUNTER — ANESTHESIA EVENT (OUTPATIENT)
Dept: ENDOSCOPY | Age: 25
End: 2025-07-09
Payer: COMMERCIAL

## 2025-07-10 ENCOUNTER — ANESTHESIA (OUTPATIENT)
Dept: ENDOSCOPY | Age: 25
End: 2025-07-10
Payer: COMMERCIAL

## 2025-07-10 ENCOUNTER — HOSPITAL ENCOUNTER (OUTPATIENT)
Age: 25
Setting detail: OUTPATIENT SURGERY
Discharge: HOME | End: 2025-07-10
Attending: STUDENT IN AN ORGANIZED HEALTH CARE EDUCATION/TRAINING PROGRAM | Admitting: STUDENT IN AN ORGANIZED HEALTH CARE EDUCATION/TRAINING PROGRAM
Payer: COMMERCIAL

## 2025-07-10 VITALS
TEMPERATURE: 97.5 F | OXYGEN SATURATION: 99 % | WEIGHT: 169 LBS | BODY MASS INDEX: 21.69 KG/M2 | HEIGHT: 74 IN | HEART RATE: 53 BPM | SYSTOLIC BLOOD PRESSURE: 117 MMHG | RESPIRATION RATE: 17 BRPM | DIASTOLIC BLOOD PRESSURE: 70 MMHG

## 2025-07-10 DIAGNOSIS — K21.9 GERD (GASTROESOPHAGEAL REFLUX DISEASE): ICD-10-CM

## 2025-07-10 PROBLEM — K20.90 ESOPHAGITIS: Status: ACTIVE | Noted: 2025-07-10

## 2025-07-10 PROCEDURE — 7100000011 HC PHASE II RECOVERY - ADDTL 15 MIN: Performed by: STUDENT IN AN ORGANIZED HEALTH CARE EDUCATION/TRAINING PROGRAM

## 2025-07-10 PROCEDURE — 88342 IMHCHEM/IMCYTCHM 1ST ANTB: CPT

## 2025-07-10 PROCEDURE — 2580000003 HC RX 258: Performed by: ANESTHESIOLOGY

## 2025-07-10 PROCEDURE — 7100000010 HC PHASE II RECOVERY - FIRST 15 MIN: Performed by: STUDENT IN AN ORGANIZED HEALTH CARE EDUCATION/TRAINING PROGRAM

## 2025-07-10 PROCEDURE — 6360000002 HC RX W HCPCS: Performed by: ANESTHESIOLOGY

## 2025-07-10 PROCEDURE — 88305 TISSUE EXAM BY PATHOLOGIST: CPT

## 2025-07-10 PROCEDURE — 2709999900 HC NON-CHARGEABLE SUPPLY: Performed by: STUDENT IN AN ORGANIZED HEALTH CARE EDUCATION/TRAINING PROGRAM

## 2025-07-10 PROCEDURE — 3609012400 HC EGD TRANSORAL BIOPSY SINGLE/MULTIPLE: Performed by: STUDENT IN AN ORGANIZED HEALTH CARE EDUCATION/TRAINING PROGRAM

## 2025-07-10 PROCEDURE — 3700000000 HC ANESTHESIA ATTENDED CARE: Performed by: STUDENT IN AN ORGANIZED HEALTH CARE EDUCATION/TRAINING PROGRAM

## 2025-07-10 PROCEDURE — 6360000002 HC RX W HCPCS: Performed by: NURSE ANESTHETIST, CERTIFIED REGISTERED

## 2025-07-10 RX ORDER — LIDOCAINE HYDROCHLORIDE 10 MG/ML
1 INJECTION, SOLUTION EPIDURAL; INFILTRATION; INTRACAUDAL; PERINEURAL
Status: COMPLETED | OUTPATIENT
Start: 2025-07-10 | End: 2025-07-10

## 2025-07-10 RX ORDER — MIDAZOLAM HYDROCHLORIDE 2 MG/2ML
INJECTION, SOLUTION INTRAMUSCULAR; INTRAVENOUS
Status: DISCONTINUED | OUTPATIENT
Start: 2025-07-10 | End: 2025-07-10 | Stop reason: SDUPTHER

## 2025-07-10 RX ORDER — PROPOFOL 10 MG/ML
INJECTION, EMULSION INTRAVENOUS
Status: DISCONTINUED | OUTPATIENT
Start: 2025-07-10 | End: 2025-07-10 | Stop reason: SDUPTHER

## 2025-07-10 RX ORDER — SODIUM CHLORIDE, SODIUM LACTATE, POTASSIUM CHLORIDE, CALCIUM CHLORIDE 600; 310; 30; 20 MG/100ML; MG/100ML; MG/100ML; MG/100ML
INJECTION, SOLUTION INTRAVENOUS CONTINUOUS
Status: DISCONTINUED | OUTPATIENT
Start: 2025-07-10 | End: 2025-07-10 | Stop reason: HOSPADM

## 2025-07-10 RX ORDER — SODIUM CHLORIDE 0.9 % (FLUSH) 0.9 %
5-40 SYRINGE (ML) INJECTION PRN
Status: DISCONTINUED | OUTPATIENT
Start: 2025-07-10 | End: 2025-07-10 | Stop reason: HOSPADM

## 2025-07-10 RX ORDER — SODIUM CHLORIDE 9 MG/ML
INJECTION, SOLUTION INTRAVENOUS PRN
Status: DISCONTINUED | OUTPATIENT
Start: 2025-07-10 | End: 2025-07-10 | Stop reason: HOSPADM

## 2025-07-10 RX ORDER — SODIUM CHLORIDE, SODIUM LACTATE, POTASSIUM CHLORIDE, CALCIUM CHLORIDE 600; 310; 30; 20 MG/100ML; MG/100ML; MG/100ML; MG/100ML
INJECTION, SOLUTION INTRAVENOUS
Status: DISCONTINUED | OUTPATIENT
Start: 2025-07-10 | End: 2025-07-10

## 2025-07-10 RX ORDER — LIDOCAINE HYDROCHLORIDE 20 MG/ML
INJECTION, SOLUTION EPIDURAL; INFILTRATION; INTRACAUDAL; PERINEURAL
Status: DISCONTINUED | OUTPATIENT
Start: 2025-07-10 | End: 2025-07-10 | Stop reason: SDUPTHER

## 2025-07-10 RX ORDER — SODIUM CHLORIDE 0.9 % (FLUSH) 0.9 %
5-40 SYRINGE (ML) INJECTION EVERY 12 HOURS SCHEDULED
Status: DISCONTINUED | OUTPATIENT
Start: 2025-07-10 | End: 2025-07-10 | Stop reason: HOSPADM

## 2025-07-10 RX ORDER — PANTOPRAZOLE SODIUM 40 MG/1
40 TABLET, DELAYED RELEASE ORAL
Qty: 60 TABLET | Refills: 1 | Status: SHIPPED | OUTPATIENT
Start: 2025-07-10

## 2025-07-10 RX ADMIN — PROPOFOL 50 MG: 10 INJECTION, EMULSION INTRAVENOUS at 12:23

## 2025-07-10 RX ADMIN — PROPOFOL 170 MG: 10 INJECTION, EMULSION INTRAVENOUS at 12:17

## 2025-07-10 RX ADMIN — MIDAZOLAM HYDROCHLORIDE 2 MG: 1 INJECTION, SOLUTION INTRAMUSCULAR; INTRAVENOUS at 12:17

## 2025-07-10 RX ADMIN — PROPOFOL 40 MG: 10 INJECTION, EMULSION INTRAVENOUS at 12:22

## 2025-07-10 RX ADMIN — LIDOCAINE HYDROCHLORIDE 100 MG: 20 INJECTION, SOLUTION EPIDURAL; INFILTRATION; INTRACAUDAL; PERINEURAL at 12:17

## 2025-07-10 RX ADMIN — PROPOFOL 30 MG: 10 INJECTION, EMULSION INTRAVENOUS at 12:18

## 2025-07-10 RX ADMIN — PROPOFOL 70 MG: 10 INJECTION, EMULSION INTRAVENOUS at 12:21

## 2025-07-10 RX ADMIN — PROPOFOL 40 MG: 10 INJECTION, EMULSION INTRAVENOUS at 12:24

## 2025-07-10 RX ADMIN — SODIUM CHLORIDE, POTASSIUM CHLORIDE, SODIUM LACTATE AND CALCIUM CHLORIDE: 600; 310; 30; 20 INJECTION, SOLUTION INTRAVENOUS at 10:26

## 2025-07-10 RX ADMIN — LIDOCAINE HYDROCHLORIDE 1 ML: 10 INJECTION, SOLUTION EPIDURAL; INFILTRATION; INTRACAUDAL; PERINEURAL at 10:25

## 2025-07-10 ASSESSMENT — ENCOUNTER SYMPTOMS
ABDOMINAL PAIN: 0
SHORTNESS OF BREATH: 0
SINUS PRESSURE: 0
NAUSEA: 0

## 2025-07-10 ASSESSMENT — PAIN - FUNCTIONAL ASSESSMENT
PAIN_FUNCTIONAL_ASSESSMENT: 0-10
PAIN_FUNCTIONAL_ASSESSMENT: NONE - DENIES PAIN

## 2025-07-10 NOTE — OP NOTE
stable with stable vital signs and stable O2 saturations.       Findings:    Esophagus:   Normal upper mid esophagus  LA grade A/B esophagitis and irregular Z-line-biopsy    Stomach:    Fundus: normal    Body: normal    Antrum: abnormal: Mild erythematous gastritis  Biopsy obtained to r/o- H pylori     Stomach appropriately distensible with gas on direct and retroflexion view    Duodenum:     Descending: normal    Bulb: normal  Biopsy obtained to r/o- celiac disease     The scope was removed and the patient tolerated the procedure well.     Impression-   Grade A/B esophagitis and irregular Z-line-  Gastritis biopsy  Celiac biopsy    Recommendations/Plan:   F/U Biopsies  Pantoprazole 40 mg twice daily for 1 month  F/U In Office in 3-4 weeks  Return to normal activities from tomorrow. Follow-up with PCP as scheduled.    Electronically signed by Julio César Pemberton MD  on 7/10/2025 at 12:28 PM   This note is created with the assistance of a speech recognition program.  While intending to generate a document that actually reflects the content of the procedure, the document can still have some errors including those of syntax and sound a like substitutions which may escape proofreading.  It such instances, actual meaning can be extrapolated by contextual diversion.

## 2025-07-10 NOTE — H&P
HISTORY and PHYSICAL  [facility]       NAME:  Mike Prasad  MRN: [id number]   YOB: 2000   Date: 7/10/2025   Age: 24 y.o.  Gender: male       COMPLAINT AND PRESENT HISTORY:      Mike Prasad is 24 y.o.,   female, here for Procedure(s):  ESOPHAGOGASTRODUODENOSCOPY BIOPSY    Pt is being seen for :  Pre-Op Diagnosis Codes:      * GERD (gastroesophageal reflux disease)     No prior EGD done before.     No  abdominal pain  No   dysphagia  Yes   heartburn with selective foods. Takes OTC tums, tried prilosec x1  Yes  nausea frequently.    yes vomiting if over eats vomiting,   No diarrhea, constipation.  No  blood in stool, dark tarry stools.  Yes  changes in appetite and unintended weight loss. 10 lbs over 3 wk period of time.   no hx of smoking.     Medical history reviewed.  Asthma- sports induced  Denies chest pain/pressure, palpitations, SOB, recent URI, fever or chills.     blood thinning medications:  none  Patient denies ever having anesthesia before and no Family issues with anesthesia.     RECENT LABS, IMAGING AND TESTING     Lab Results   Component Value Date    WBC 6.6 07/03/2025    WBC 6.6 07/03/2025    RBC 5.55 07/03/2025    RBC 5.54 07/03/2025    HGB 17.0 07/03/2025    HGB 16.9 07/03/2025    HCT 49.1 07/03/2025    HCT 48.8 07/03/2025    MCV 88.5 07/03/2025    MCV 88.1 07/03/2025    MCH 30.6 07/03/2025    MCH 30.5 07/03/2025    MCHC 34.6 07/03/2025    MCHC 34.6 07/03/2025    RDW 12.5 07/03/2025    RDW 12.4 07/03/2025     07/03/2025     07/03/2025    MPV 9.4 07/03/2025    MPV 9.5 07/03/2025        Lab Results   Component Value Date     07/03/2025     07/03/2025    K 4.8 07/03/2025    K 4.7 07/03/2025     07/03/2025     07/03/2025    CO2 25 07/03/2025    CO2 25 07/03/2025    BUN 15 07/03/2025    BUN 16 07/03/2025    CREATININE 1.2 07/03/2025    CREATININE 1.1 07/03/2025    GLUCOSE 94 07/03/2025    GLUCOSE 91 07/03/2025    CALCIUM 9.7

## 2025-07-10 NOTE — DISCHARGE INSTRUCTIONS
EGD DISCHARGE INSTRUCTIONS    Activity:  Rest today. No driving, operating machinery, or making any important decisions today. May resume normal activity tomorrow.    Diet:  Following EGD eat slowly, chew food well, cut food into small pieces-Avoid greasy, spicy, \"crunchy\" foods X 48 hours.     Call your Doctor if you have any of the following:  -Passing blood rectally or vomiting blood (it may be red or black).  -Persistent nausea/vomiting  -Severe abdominal or chest pain not relieved with passing gas  -Fever of 100 degrees or more  -Redness or swelling at the IV site  -Severe sore throat or neck pain     Gastritis: Care Instructions  Overview     Gastritis is an upset stomach. It happens when something irritates the stomach lining. Many things can cause gastritis, such as an infection or illness, food or drink, or medicines. Your belly may bloat and ache. You may belch, vomit, and feel sick to your stomach.  Minor stomach upset can be treated at home. Medicines that reduce or block stomach acid may help. If gastritis lasts, your doctor may prescribe medicine.  Follow-up care is a key part of your treatment and safety. Be sure to make and go to all appointments, and call your doctor if you are having problems. It's also a good idea to know your test results and keep a list of the medicines you take.  How can you care for yourself at home?  If your doctor prescribed antibiotics, take them as directed. Do not stop taking them just because you feel better. You need to take the full course of antibiotics.  Be safe with medicines. If your doctor prescribed medicine to decrease stomach acid, take it as directed. Call your doctor if you think you are having a problem with your medicine.  Do not take any other medicine, including over-the-counter pain relievers, without talking to your doctor first.  If your doctor recommends over-the-counter medicine to reduce stomach acid, such as Pepcid AC (famotidine) or Prilosec

## 2025-07-10 NOTE — ANESTHESIA POSTPROCEDURE EVALUATION
Department of Anesthesiology  Postprocedure Note    Patient: Mike Prasad  MRN: [id number]  YOB: 2000  Date of evaluation: 7/10/2025    Procedure Summary       Date: 07/10/25 Room / Location: [place] [address] / [facility]    Anesthesia Start: 1214 Anesthesia Stop: 1230    Procedure: ESOPHAGOGASTRODUODENOSCOPY BIOPSY (Esophagus) Diagnosis:       GERD (gastroesophageal reflux disease)      (GERD (gastroesophageal reflux disease) [K21.9])    Surgeons: Julio César Pemberton MD Responsible Provider: Agus Morfin MD    Anesthesia Type: general, TIVA ASA Status: 2            Anesthesia Type: No value filed.    Stacey Phase I: Stacey Score: 10    Stacey Phase II: Stacey Score: 4    Anesthesia Post Evaluation    Comments: POST- ANESTHESIA EVALUATION       Pt Name: Mike Prasad  MRN: [id number]  YOB: 2000  Date of evaluation: 7/10/2025  Time:  1:42 PM      /70   Pulse 53   Temp 97.5 °F (36.4 °C) (Infrared)   Resp 17   Ht 1.88 m (6' 2\")   Wt 76.7 kg (169 lb)   SpO2 99%   BMI 21.70 kg/m²      Consciousness Level  Awake  Cardiopulmonary Status  Stable  Pain Adequately Treated YES  Nausea / Vomiting  NO  Adequate Hydration  YES  Anesthesia Related Complications NONE      Electronically signed by Agus Morfin MD on 7/10/2025 at 1:42 PM           No notable events documented.

## 2025-07-10 NOTE — ANESTHESIA PRE PROCEDURE
BILITOT 0.9 07/03/2025 04:05 PM    ALKPHOS 61 07/03/2025 04:05 PM    ALKPHOS 64 07/03/2025 04:05 PM    AST 25 07/03/2025 04:05 PM    AST 25 07/03/2025 04:05 PM    ALT 20 07/03/2025 04:05 PM    ALT 19 07/03/2025 04:05 PM       POC Tests: No results for input(s): \"POCGLU\", \"POCNA\", \"POCK\", \"POCCL\", \"POCBUN\", \"POCHEMO\", \"POCHCT\" in the last 72 hours.    Coags: No results found for: \"PROTIME\", \"INR\", \"APTT\"    HCG (If Applicable): No results found for: \"PREGTESTUR\", \"PREGSERUM\", \"HCG\", \"HCGQUANT\"     ABGs: No results found for: \"PHART\", \"PO2ART\", \"AHV5YEW\", \"TGI1YDQ\", \"BEART\", \"L9VXHCZN\"     Type & Screen (If Applicable):  No results found for: \"ABORH\", \"LABANTI\"    Drug/Infectious Status (If Applicable):  Lab Results   Component Value Date/Time    HEPCAB NONREACTIVE 11/22/2021 03:00 PM       COVID-19 Screening (If Applicable):   Lab Results   Component Value Date/Time    COVID19 Not Detected 11/13/2020 09:02 AM           Anesthesia Evaluation  Patient summary reviewed and Nursing notes reviewed  Airway: Mallampati: II  TM distance: >3 FB   Neck ROM: full  Mouth opening: > = 3 FB   Dental: normal exam         Pulmonary:normal exam  breath sounds clear to auscultation  (+)           asthma:                            Cardiovascular:Negative CV ROS          ECG reviewed  Rhythm: regular  Rate: normal           Beta Blocker:  Not on Beta Blocker         Neuro/Psych:   (+) psychiatric history:            GI/Hepatic/Renal:   (+) GERD:          Endo/Other: Negative Endo/Other ROS                    Abdominal:             Vascular: negative vascular ROS.         Other Findings:       Anesthesia Plan      general and TIVA     ASA 2       Induction: intravenous.    MIPS: Postoperative opioids intended and Prophylactic antiemetics administered.  Anesthetic plan and risks discussed with patient.      Plan discussed with CRNA.                Agus Morfin MD   7/10/2025

## 2025-07-11 LAB — SURGICAL PATHOLOGY REPORT: NORMAL

## 2025-08-11 ENCOUNTER — OFFICE VISIT (OUTPATIENT)
Dept: GASTROENTEROLOGY | Age: 25
End: 2025-08-11
Payer: COMMERCIAL

## 2025-08-11 VITALS
DIASTOLIC BLOOD PRESSURE: 76 MMHG | OXYGEN SATURATION: 99 % | SYSTOLIC BLOOD PRESSURE: 139 MMHG | WEIGHT: 177 LBS | BODY MASS INDEX: 22.72 KG/M2 | HEART RATE: 82 BPM | HEIGHT: 74 IN

## 2025-08-11 DIAGNOSIS — R11.0 NAUSEA: ICD-10-CM

## 2025-08-11 DIAGNOSIS — R63.4 WEIGHT LOSS: Primary | ICD-10-CM

## 2025-08-11 DIAGNOSIS — F10.10 ALCOHOL CONSUMPTION BINGE DRINKING: ICD-10-CM

## 2025-08-11 DIAGNOSIS — K21.9 GASTROESOPHAGEAL REFLUX DISEASE, UNSPECIFIED WHETHER ESOPHAGITIS PRESENT: ICD-10-CM

## 2025-08-11 PROCEDURE — 99214 OFFICE O/P EST MOD 30 MIN: CPT | Performed by: STUDENT IN AN ORGANIZED HEALTH CARE EDUCATION/TRAINING PROGRAM

## 2025-08-11 RX ORDER — SUCRALFATE 1 G/1
1 TABLET ORAL 3 TIMES DAILY
Qty: 42 TABLET | Refills: 0 | Status: SHIPPED | OUTPATIENT
Start: 2025-08-11 | End: 2025-08-25

## 2025-08-11 RX ORDER — MIRTAZAPINE 7.5 MG/1
7.5 TABLET, FILM COATED ORAL NIGHTLY
Qty: 30 TABLET | Refills: 5 | Status: SHIPPED | OUTPATIENT
Start: 2025-08-11

## (undated) DEVICE — GOWN,POLY REINFORCED,LG: Brand: MEDLINE

## (undated) DEVICE — SINGLE USE AIR/WATER, SUCTION AND BIOPSY VALVES SET: Brand: ORCAPOD™

## (undated) DEVICE — FORCEPS BX L240CM JAW DIA2.8MM L CAP W/ NDL MIC MESH TOOTH

## (undated) DEVICE — ENDOSCOPIC KIT 1.1+ 10 FT OP4 CA DE

## (undated) DEVICE — BITEBLOCK 54FR W/ DENT RIM BLOX

## (undated) DEVICE — GLOVE ORANGE PI 8 1/2   MSG9085

## (undated) DEVICE — GAUZE,SPONGE,4"X4",16PLY,STRL,LF,10/TRAY: Brand: MEDLINE